# Patient Record
(demographics unavailable — no encounter records)

---

## 2024-11-05 NOTE — HISTORY OF PRESENT ILLNESS
[de-identified] : KHUSHBU GERMAN  is a 71 year old  female with CAD, HTN, on clopidogrel,  asthma, GERD, [facility]. = referred by Dr Lane Armijo  - She developed asthma as a teen when family got a dog. She takes daily Montelukast for asthma and PRN albuterol. Needs short acting bronchodilator may be once a year, physically active.  Allergic Rhinitis -Received allergen immunotherapy as a teen-early twenties, it helped - No history of sinusitis, sense of smell intact, no sinus surgeries - no history of random urticaria  History of DRUG REACTIONS: - Toradol- in her 40s had D&C, received Toradol and felt very congested. Denies rashes. Ffelt very mucousy, reportedly treated with antihistamines.  - She hasnt tried any NSAIDs since. She is not sure about taking them prior to this reaction.

## 2024-11-05 NOTE — CONSULT LETTER
[Dear  ___] : Dear  [unfilled], [Consult Letter:] : I had the pleasure of evaluating your patient, [unfilled]. [Please see my note below.] : Please see my note below. [Consult Closing:] : Thank you very much for allowing me to participate in the care of this patient.  If you have any questions, please do not hesitate to contact me. [Sincerely,] : Sincerely, [FreeTextEntry3] : MD CAROL Veloz, RADHA Adult and Pediatric Allergy, Asthma and Clinical Immunology Associate Professor of Medicine and Pediatrics at   [facility], Adult Allergy and Immunology   [facility]   Division of Allergy, Asthma and Immunology   [address] [place] [address], [place] [address]   Phone [phone]  Fax [phone]

## 2024-11-05 NOTE — ASSESSMENT
[FreeTextEntry1] :  71 year old female with CAD, HTN, on clopidogrel, asthma, GERD, [facility]. - Toradol- in her 40s had D&C, received Toradol and felt very congested. Denies rashes. Ffelt very mucousy, reportedly treated with antihistamines. - She hasnt tried any NSAIDs since. She is not sure about taking them prior to this reaction.  ADVERSE DRUG REACTION Reaction to Toradol is more concerning for adverse effects, rather than hypersensitivity reaction. There is no history concerning for Aspirin Exacerbated Respiratory Disease (AERD) or chronic spontaneous urticaria. Patient needs Aspirin for treatment of CAD = plan for aspirin challenge tomorrow (40, 81, 325)    ASTHMA, mild persistent well controlled on Montelukast  - she does report exertional SOB that resolves after she stops exercising - Spirometry was performed to evaluate/ monitor asthma: spirometry is abnormal, suggestive of tracheomalacia.  --- consider repeating Asthma education including information on recognizing asthma triggers, symptoms, and treatment was provided. - continue Montelukast as prescribed as the asthma maintenance medication. Use Albuterol as the asthma rescue medication. At the first sign of an upper respiratory tract infection, start using Albuterol 3-4 times a day (wait at least 4 hours between the doses) for 3 to 5 days. After 3 to 5 days, resume using this rescue medication as needed. Use HFA inhaler with spacer

## 2024-11-05 NOTE — CONSULT LETTER
[Dear  ___] : Dear  [unfilled], [Consult Letter:] : I had the pleasure of evaluating your patient, [unfilled]. [Please see my note below.] : Please see my note below. [Consult Closing:] : Thank you very much for allowing me to participate in the care of this patient.  If you have any questions, please do not hesitate to contact me. [Sincerely,] : Sincerely, [FreeTextEntry3] : MD CAROL Veloz, RADHA Adult and Pediatric Allergy, Asthma and Clinical Immunology Associate Professor of Medicine and Pediatrics at   [facility], Adult Allergy and Immunology   [facility]   Division of Allergy, Asthma and Immunology   [address] [place] [address], [place] [address]   Phone [phone]  Fax [phone]

## 2024-11-05 NOTE — HISTORY OF PRESENT ILLNESS
[de-identified] : KHUSHBU GERMAN  is a 71 year old  female with CAD, HTN, on clopidogrel,  asthma, GERD, [facility]. = referred by Dr Lane Armijo  - She developed asthma as a teen when family got a dog. She takes daily Montelukast for asthma and PRN albuterol. Needs short acting bronchodilator may be once a year, physically active.  Allergic Rhinitis -Received allergen immunotherapy as a teen-early twenties, it helped - No history of sinusitis, sense of smell intact, no sinus surgeries - no history of random urticaria  History of DRUG REACTIONS: - Toradol- in her 40s had D&C, received Toradol and felt very congested. Denies rashes. Ffelt very mucousy, reportedly treated with antihistamines.  - She hasnt tried any NSAIDs since. She is not sure about taking them prior to this reaction.

## 2024-11-05 NOTE — PHYSICAL EXAM
[Alert] : alert [Well Nourished] : well nourished [Healthy Appearance] : healthy appearance [No Acute Distress] : no acute distress [Well Developed] : well developed [Normal Voice/Communication] : normal voice communication [No Discharge] : no discharge [No Photophobia] : no photophobia [Sclera Not Icteric] : sclera not icteric [Normal TMs] : both tympanic membranes were normal [Normal Outer Ear/Nose] : the ears and nose were normal in appearance [No Thrush] : no thrush [Pale mucosa] : pale mucosa [Boggy Nasal Turbinates] : boggy and/or pale nasal turbinates [Supple] : the neck was supple [Normal Rate and Effort] : normal respiratory rhythm and effort [No Crackles] : no crackles [Bilateral Audible Breath Sounds] : bilateral audible breath sounds [Normal Rate] : heart rate was normal  [Normal S1, S2] : normal S1 and S2 [Soft] : abdomen soft [Not Tender] : non-tender [Not Distended] : not distended [No HSM] : no hepato-splenomegaly [No Rash] : no rash [No clubbing] : no clubbing [No Edema] : no edema [No Cyanosis] : no cyanosis [Normal Mood] : mood was normal [Normal Affect] : affect was normal [Alert, Awake, Oriented as Age-Appropriate] : alert, awake, oriented as age appropriate [Conjunctival Erythema] : no conjunctival erythema [Pharyngeal erythema] : no pharyngeal erythema [Exudate] : no exudate [Wheezing] : no wheezing was heard [Urticaria] : no urticaria

## 2024-11-07 NOTE — HISTORY OF PRESENT ILLNESS
[Consent obtained and signed form scanned in to chart] : Consent obtained and signed form scanned in to chart [] : The following medications are to be available during the challenge procedure: [Diphenhydramine] : Diphenhydramine, 1-2mg/kg IM (max dose 50mg), (50mg/1 cc) [___ mg] : Dose: [unfilled] mg [___ cc] : Volume: [unfilled] cc [Solucortef] : Solucortef, 4-8 mg/kg IM (max dose 200 mg), (100mg/2 cc) [Epinephrine 1:1000 IM] : Epinephrine 1:1000 IM, 0.01cc/kg (max dose 0.5 cc) [Albuterol MDI] : Albuterol MDI, 2 - 4 puffs [Albuterol nebulized] : Albuterol nebulized, 0.083% [___] : HR: [unfilled]  [_______] : Time: [unfilled] [Clear] : Skin Findings: Clear [No] : Reaction: No [___] : Amount: [unfilled] [___% 1) Skin -  A) Erythematous rash - % area involved] : Erythematous Rash (IA): [unfilled] % area involved [0 Pruritus: 0  - absent] : Pruritus (IB): 0 - absent [0 Urticaria/Angioedema: 0 - Absent] : Urticaria/Angioedema (IC): 0  - Absent [0 Rash: 0 - Absent] : Rash (ID): 0 - Absent [0 Sneezing/Itchin - Absent] : Sneezing/Itching (IIA): 0 - Absent [0 Nasal congestion: 0 - Absent] : Nasal congestion (IIB): 0 - Absent [0 Rhinorrhea: 0 - Absent] : Rhinorrhea (IIC): 0 - Absent [0 Laryngeal: 0 - Absent] : Laryngeal (IID): 0 - Absent [0 Wheezin - Absent] : Wheezing (IIIA): 0 - Absent [0 Gastro-Subjective complaints: 0 - Absent] : Gastro-Subjective Complaints (GARCIA): 0 - Absent [0 Gastro-Objective complaints: 0 - Absent] : Gastro-Objective Complaints (IVB): 0 - Absent [de-identified] : KHUSHBU GERMAN is a 71 year old female with CAD, HTN, on clopidogrel, asthma, GERD, [facility]. = referred by Dr Lane Armijo  - She developed asthma as a teen when family got a dog. She takes daily Montelukast for asthma and PRN albuterol. Needs short acting bronchodilator may be once a year, physically active. Allergic Rhinitis -Received allergen immunotherapy as a teen-early twenties, it helped - No history of sinusitis, sense of smell intact, no sinus surgeries - no history of random urticaria  History of DRUG REACTIONS: - Toradol- in her 40s had D&C, received Toradol and felt very congested. Denies rashes. Ffelt very mucousy, reportedly treated with antihistamines. - She hasnt tried any NSAIDs since. She is not sure about taking them prior to this reaction.  [FreeTextEntry1] : Aspirin [FreeTextEntry2] : 486 mg [FreeTextEntry3] : lungs clear no hives or rashes to face or body [FreeTextEntry4] : stable [FreeTextEntry5] : no reaction [FreeTextEntry9] : consent signed [de-identified] : "fell slight tingling to lower lip, no swelling. Dr Petty informed [de-identified] : no reaction [de-identified] : stable [de-identified] : no rteaction [de-identified] : no reaction [de-identified] : wa;robbiing no reaction [de-identified] : no reaction [de-identified] : no reaction [de-identified] : completed challenge with observation. Seen by Dr Petty and discharged home stable /86

## 2024-11-07 NOTE — DATA REVIEWED
[FreeTextEntry1] :  Fraction of exhaled nitric oxide (FEno) - 33  Spirometry 11/5/24- no obstruction, concern for tracheomalacia vs technique

## 2024-11-07 NOTE — HISTORY OF PRESENT ILLNESS
[Consent obtained and signed form scanned in to chart] : Consent obtained and signed form scanned in to chart [] : The following medications are to be available during the challenge procedure: [Diphenhydramine] : Diphenhydramine, 1-2mg/kg IM (max dose 50mg), (50mg/1 cc) [___ mg] : Dose: [unfilled] mg [___ cc] : Volume: [unfilled] cc [Solucortef] : Solucortef, 4-8 mg/kg IM (max dose 200 mg), (100mg/2 cc) [Epinephrine 1:1000 IM] : Epinephrine 1:1000 IM, 0.01cc/kg (max dose 0.5 cc) [Albuterol MDI] : Albuterol MDI, 2 - 4 puffs [Albuterol nebulized] : Albuterol nebulized, 0.083% [___] : HR: [unfilled]  [_______] : Time: [unfilled] [Clear] : Skin Findings: Clear [No] : Reaction: No [___] : Amount: [unfilled] [___% 1) Skin -  A) Erythematous rash - % area involved] : Erythematous Rash (IA): [unfilled] % area involved [0 Pruritus: 0  - absent] : Pruritus (IB): 0 - absent [0 Urticaria/Angioedema: 0 - Absent] : Urticaria/Angioedema (IC): 0  - Absent [0 Rash: 0 - Absent] : Rash (ID): 0 - Absent [0 Sneezing/Itchin - Absent] : Sneezing/Itching (IIA): 0 - Absent [0 Nasal congestion: 0 - Absent] : Nasal congestion (IIB): 0 - Absent [0 Rhinorrhea: 0 - Absent] : Rhinorrhea (IIC): 0 - Absent [0 Laryngeal: 0 - Absent] : Laryngeal (IID): 0 - Absent [0 Wheezin - Absent] : Wheezing (IIIA): 0 - Absent [0 Gastro-Subjective complaints: 0 - Absent] : Gastro-Subjective Complaints (GARCIA): 0 - Absent [0 Gastro-Objective complaints: 0 - Absent] : Gastro-Objective Complaints (IVB): 0 - Absent [de-identified] : KHUSHBU GERMAN is a 71 year old female with CAD, HTN, on clopidogrel, asthma, GERD, [facility]. = referred by Dr Lane Armijo  - She developed asthma as a teen when family got a dog. She takes daily Montelukast for asthma and PRN albuterol. Needs short acting bronchodilator may be once a year, physically active. Allergic Rhinitis -Received allergen immunotherapy as a teen-early twenties, it helped - No history of sinusitis, sense of smell intact, no sinus surgeries - no history of random urticaria  History of DRUG REACTIONS: - Toradol- in her 40s had D&C, received Toradol and felt very congested. Denies rashes. Ffelt very mucousy, reportedly treated with antihistamines. - She hasnt tried any NSAIDs since. She is not sure about taking them prior to this reaction.  [FreeTextEntry1] : Aspirin [FreeTextEntry2] : 486 mg [FreeTextEntry3] : lungs clear no hives or rashes to face or body [FreeTextEntry4] : stable [FreeTextEntry5] : no reaction [FreeTextEntry9] : consent signed [de-identified] : "fell slight tingling to lower lip, no swelling. Dr Petty informed [de-identified] : no reaction [de-identified] : stable [de-identified] : no rteaction [de-identified] : no reaction [de-identified] : wa;robbiing no reaction [de-identified] : no reaction [de-identified] : no reaction [de-identified] : completed challenge with observation. Seen by Dr Petty and discharged home stable /86

## 2024-11-07 NOTE — PROCEDURE
[Patient ingested ___ amount of allergen] : Patient ingested [unfilled] amount of allergen [Pass] : Challenge: Pass [FreeTextEntry1] : Patient here for oral aspirin challenge. All procedures explained to patient and consent signed. Baby Aspirin by [employer] Lot #  325 mg [id number] EXP 4/2025 81 mg by [employer] Lot # [id number] EXP 9/2025 NDC ## [id number]

## 2024-11-07 NOTE — PLAN
[FreeTextEntry1] :  Medication CHALLENGE: She is not considered allergic to Aspirin   any longer and can take it as indicated. Aspirin was removed from her allergy list. Signs and symptoms of delayed allergic reaction reviewed. In case of delayed allergic symptoms, patient was instructed to stop medication and contact our office promptly.  = consider Ibuprofen challenge in the future = Avoid Ketorolac; if needed- can do skin testing in the future  History of intermittent asthma, abnormal spirometry (technique vs tracheomalacia) - patient denies any symptoms concerning for malacia - repeat spirometry in 2-3 months

## 2024-11-07 NOTE — PHYSICAL EXAM
[Alert] : alert [Well Nourished] : well nourished [Healthy Appearance] : healthy appearance [No Acute Distress] : no acute distress [No Thrush] : no thrush [Normal Rate and Effort] : normal respiratory rhythm and effort [No Crackles] : no crackles [Bilateral Audible Breath Sounds] : bilateral audible breath sounds [Normal Rate] : heart rate was normal  [Regular Rhythm] : with a regular rhythm [Normal Mood] : mood was normal [Normal Affect] : affect was normal [Alert, Awake, Oriented as Age-Appropriate] : alert, awake, oriented as age appropriate [Pharyngeal erythema] : no pharyngeal erythema [Exudate] : no exudate [Wheezing] : no wheezing was heard [Urticaria] : no urticaria

## 2025-01-17 NOTE — REASON FOR VISIT
[Symptom and Test Evaluation] : symptom and test evaluation [CV Risk Factors and Non-Cardiac Disease] : CV risk factors and non-cardiac disease [Arrhythmia/ECG Abnorrmalities] : arrhythmia/ECG abnormalities [Hyperlipidemia] : hyperlipidemia [Hypertension] : hypertension [Coronary Artery Disease] : coronary artery disease [Spouse] : spouse [FreeTextEntry1] : Dear Dr. Admae:  Ms. Mroales presents for follow-up cardiovascular evaluation.  I last saw her in September 2024.  Since her last visit, she has visited Allergy and deemed not to have an aspirin allergy.  She is now on aspirin monotheraoy for primary prevention of CAD. She has also since started rosuvastatin without effects on her LFTs. Will reassess lipids and CMP.  Continue to improve on her diet.  We also reviewed 2 test results -- Zio patch monitoring noted SR with episodes of SVT (AT?) with APCs. Cardiac MRI was unremarkable. My review of a prior echocardiogram performed on July 2024 demonstrated no significant interval changes with mild AI, trace MR and mild TR.   My review of her 12-lead for her history of HTN showed Sinus bradycardia, normal axis, normal intervals.    Will arrange for TTE and NST. Continue current medications.  If stable, F/U in six months.  Thank you for involving me in her care.  Warmest regards, Lane Armijo

## 2025-01-17 NOTE — PHYSICAL EXAM
[Well Developed] : well developed [Well Nourished] : well nourished [No Acute Distress] : no acute distress [Normal Conjunctiva] : normal conjunctiva [Normal Venous Pressure] : normal venous pressure [No Carotid Bruit] : no carotid bruit [Normal S1, S2] : normal S1, S2 [No Rub] : no rub [No Gallop] : no gallop [Clear Lung Fields] : clear lung fields [Good Air Entry] : good air entry [No Respiratory Distress] : no respiratory distress  [Soft] : abdomen soft [Non Tender] : non-tender [No Masses/organomegaly] : no masses/organomegaly [Normal Bowel Sounds] : normal bowel sounds [Normal Gait] : normal gait [No Edema] : no edema [No Cyanosis] : no cyanosis [No Clubbing] : no clubbing [No Varicosities] : no varicosities [No Rash] : no rash [No Skin Lesions] : no skin lesions [Moves all extremities] : moves all extremities [No Focal Deficits] : no focal deficits [Normal Speech] : normal speech [Alert and Oriented] : alert and oriented [Normal memory] : normal memory [de-identified] : Grade 2/6 SM

## 2025-02-10 NOTE — HISTORY OF PRESENT ILLNESS
[de-identified] : KHUSHBU GEMRAN  is a 71 year old  female with CAD, HTN, on clopidogrel,  asthma, GERD, [facility]. = Toradol- in her 40s had D&C, received Toradol and felt very congested. Denies rashes. Ffelt very mucousy, reportedly treated with antihistamines. = 11/6/24- negative aspirin challenge  2/10/25: - had COVID-19 3 weeks ago, 1st time. Her symptoms were mostly fever and fatigue. She took Paxlovid, now feels almost back to baseline - she took Motrin during COVID-19 and tolerated it well  HISTORY:  - She developed asthma as a teen when family got a dog. She takes daily Montelukast for asthma and PRN albuterol. Needs short acting bronchodilator may be once a year, physically active.  Allergic Rhinitis -Received allergen immunotherapy as a teen-early twenties, it helped - No history of sinusitis, sense of smell intact, no sinus surgeries - no history of random urticaria  History of DRUG REACTIONS: - Toradol- in her 40s had D&C, received Toradol and felt very congested. Denies rashes. Ffelt very mucousy, reportedly treated with antihistamines.  - She hasnt tried any NSAIDs since. She is not sure about taking them prior to this reaction.    [> or = 20] : > than or = 20 [FreeTextEntry7] : 24

## 2025-02-10 NOTE — PHYSICAL EXAM
[Alert] : alert [Well Nourished] : well nourished [No Acute Distress] : no acute distress [Normal Rate and Effort] : normal respiratory rhythm and effort [No Crackles] : no crackles [Bilateral Audible Breath Sounds] : bilateral audible breath sounds [Wheezing] : no wheezing was heard [Normal Rate] : heart rate was normal  [Regular Rhythm] : with a regular rhythm [No Rash] : no rash [Urticaria] : no urticaria [Normal Mood] : mood was normal [Normal Affect] : affect was normal [Alert, Awake, Oriented as Age-Appropriate] : alert, awake, oriented as age appropriate

## 2025-02-10 NOTE — ASSESSMENT
[FreeTextEntry1] : 71 year old female with CAD, HTN, on clopidogrel, asthma, GERD, remote HISTORY of adverse reaction to Toradol  and abnormal spirometry, returns for a follow up.  - Toradol- in her 40s had D&C, received Toradol and felt very congested. Denies rashes. Ffelt very mucousy, reportedly treated with antihistamines.  ADVERSE DRUG REACTION Reaction to Toradol is more concerning for adverse effects, rather than hypersensitivity reaction. There is no history concerning for Aspirin Exacerbated Respiratory Disease (AERD) or chronic spontaneous urticaria. She has tolerated Aspirin and self-challenged herself to Ibuprofen without any adverse symptoms  - Avoid Toradol, if needs it in the future, recommended administering the 1st dose as a physician supervised challenge - Can take other NSAIDs as indicated  ASTHMA, mild persistent well controlled on Montelukast  = no significant respiratory symptoms during recent COVID-19 disease (patient reports 1st COVID-19 infection in her life) - she does report exertional SOB that resolves after she stops exercising - Oemvkeobde67/5/24 with notching  suggestive of tracheomalacia.  Due to recent COVID-19, would defer repeating today.  = Patient will go to pulmonary PFT lab for full PFT. Will discuss results when available Asthma education including information on recognizing asthma triggers, symptoms, and treatment was provided. - continue Montelukast as prescribed as the asthma maintenance medication. - continue Albuterol as the asthma rescue medication. At the first sign of an upper respiratory tract infection, start using Albuterol 3-4 times a day (wait at least 4 hours between the doses) for 3 to 5 days. After 3 to 5 days, resume using this rescue medication as needed. Use HFA inhaler with spacer

## 2025-02-10 NOTE — CONSULT LETTER
[Dear  ___] : Dear  [unfilled], [Consult Letter:] : I had the pleasure of evaluating your patient, [unfilled]. [Please see my note below.] : Please see my note below. [Consult Closing:] : Thank you very much for allowing me to participate in the care of this patient.  If you have any questions, please do not hesitate to contact me. [Sincerely,] : Sincerely, [FreeTextEntry3] : MD CAROL Veloz, RADHA Adult and Pediatric Allergy, Asthma and Clinical Immunology Associate Professor of Medicine and Pediatrics at   [facility], Adult Allergy and Immunology   [facility]   Division of Allergy, Asthma and Immunology   [address] [place] [address], [place] [address]   Phone [phone]  Fax [phone]

## 2025-04-07 NOTE — HISTORY OF PRESENT ILLNESS
[TextBox_4] : 71-year-old female past medical history CAD, hypertension, asthma, and GERD presenting for evaluation of asthma.  Patient follows with Dr. Petty from allergy/immunology.  She has a history of adverse reaction to Toradol without overt history of AERD.  She has been maintained on albuterol as well as montelukast. CT chest from 2018 without overt parenchymal abnormalities.   Diagnosed with asthma in her teens. Was previously treated with theophylline and had exacerbation during pregnancy. Never smoked previously. Does report triggers primarily secondary to allergies. Had hysterectomy/partial colectomy for prolapse in 2010 and developed post-operative asthma exacerbation. Has never been hospitalized for respiratory conditions otherwise. Does report nasal congestion. Has been using claritin. Has not required albuterol inhaler in months. No recent exacerbation in years.  ET is unlimited on flat surface though does have exertional dyspnea with stairs. Denies orthopnea or stridor. Follows with Dr. Rouse for thyroid nodules.   UTD on pneumococcal.

## 2025-04-07 NOTE — PHYSICAL EXAM
[No Acute Distress] : no acute distress [No Resp Distress] : no resp distress [Clear to Auscultation Bilaterally] : clear to auscultation bilaterally [Normal Gait] : normal gait [Oriented x3] : oriented x3 [Normal Affect] : normal affect

## 2025-04-07 NOTE — ASSESSMENT
[FreeTextEntry1] : 71-year-old female past medical history CAD, hypertension, asthma, and GERD presenting for evaluation of asthma. She recently underwent spirometry by her allergist/immunologist with concern for notching. She is relatively asymptomatic only with exertional dyspnea on stairs without associated orthopnea, stridor, cough, or wheezes. PFTs today are normal and she has not required significant bronchodilator therapy in months. Further evaluation with imaging/sleep study for TBM evaluation was discussed, though given relatively asymptomatic state will monitor clinically for now. She is advised to try albuterol with exertion in order to assess symptom response and need for long acting therapies.   Follow up in 6 months